# Patient Record
Sex: FEMALE | Race: WHITE | HISPANIC OR LATINO
[De-identification: names, ages, dates, MRNs, and addresses within clinical notes are randomized per-mention and may not be internally consistent; named-entity substitution may affect disease eponyms.]

---
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
## 2023-06-05 ENCOUNTER — NON-APPOINTMENT (OUTPATIENT)
Age: 18
End: 2023-06-05

## 2023-07-10 ENCOUNTER — APPOINTMENT (OUTPATIENT)
Dept: INTERNAL MEDICINE | Facility: CLINIC | Age: 18
End: 2023-07-10

## 2023-10-20 ENCOUNTER — APPOINTMENT (OUTPATIENT)
Dept: INTERNAL MEDICINE | Facility: CLINIC | Age: 18
End: 2023-10-20

## 2023-11-19 ENCOUNTER — EMERGENCY (EMERGENCY)
Facility: HOSPITAL | Age: 18
LOS: 1 days | Discharge: HOME | End: 2023-11-19
Attending: EMERGENCY MEDICINE
Payer: MEDICAID

## 2023-11-19 VITALS
TEMPERATURE: 98 F | HEART RATE: 61 BPM | SYSTOLIC BLOOD PRESSURE: 115 MMHG | OXYGEN SATURATION: 99 % | DIASTOLIC BLOOD PRESSURE: 78 MMHG | RESPIRATION RATE: 18 BRPM

## 2023-11-19 VITALS
WEIGHT: 134.92 LBS | OXYGEN SATURATION: 99 % | HEART RATE: 67 BPM | SYSTOLIC BLOOD PRESSURE: 126 MMHG | TEMPERATURE: 99 F | RESPIRATION RATE: 17 BRPM | DIASTOLIC BLOOD PRESSURE: 80 MMHG | HEIGHT: 65 IN

## 2023-11-19 PROBLEM — Z78.9 OTHER SPECIFIED HEALTH STATUS: Chronic | Status: ACTIVE | Noted: 2022-07-27

## 2023-11-19 PROCEDURE — 73140 X-RAY EXAM OF FINGER(S): CPT

## 2023-11-19 PROCEDURE — 99284 EMERGENCY DEPT VISIT MOD MDM: CPT

## 2023-11-19 PROCEDURE — 96374 THER/PROPH/DIAG INJ IV PUSH: CPT | Mod: XU

## 2023-11-19 PROCEDURE — 73140 X-RAY EXAM OF FINGER(S): CPT | Mod: 26,59,LT

## 2023-11-19 PROCEDURE — 99284 EMERGENCY DEPT VISIT MOD MDM: CPT | Mod: 25

## 2023-11-19 PROCEDURE — 90471 IMMUNIZATION ADMIN: CPT

## 2023-11-19 PROCEDURE — 73130 X-RAY EXAM OF HAND: CPT

## 2023-11-19 PROCEDURE — 73130 X-RAY EXAM OF HAND: CPT | Mod: 26,LT

## 2023-11-19 PROCEDURE — 12001 RPR S/N/AX/GEN/TRNK 2.5CM/<: CPT

## 2023-11-19 PROCEDURE — 90715 TDAP VACCINE 7 YRS/> IM: CPT

## 2023-11-19 RX ORDER — CEFAZOLIN SODIUM 1 G
2000 VIAL (EA) INJECTION ONCE
Refills: 0 | Status: COMPLETED | OUTPATIENT
Start: 2023-11-19 | End: 2023-11-19

## 2023-11-19 RX ORDER — CEPHALEXIN 500 MG
1 CAPSULE ORAL
Qty: 40 | Refills: 0
Start: 2023-11-19 | End: 2023-11-28

## 2023-11-19 RX ORDER — ACETAMINOPHEN 500 MG
975 TABLET ORAL ONCE
Refills: 0 | Status: COMPLETED | OUTPATIENT
Start: 2023-11-19 | End: 2023-11-19

## 2023-11-19 RX ORDER — TETANUS TOXOID, REDUCED DIPHTHERIA TOXOID AND ACELLULAR PERTUSSIS VACCINE, ADSORBED 5; 2.5; 8; 8; 2.5 [IU]/.5ML; [IU]/.5ML; UG/.5ML; UG/.5ML; UG/.5ML
0.5 SUSPENSION INTRAMUSCULAR ONCE
Refills: 0 | Status: COMPLETED | OUTPATIENT
Start: 2023-11-19 | End: 2023-11-19

## 2023-11-19 RX ADMIN — Medication 100 MILLIGRAM(S): at 12:36

## 2023-11-19 RX ADMIN — Medication 975 MILLIGRAM(S): at 11:59

## 2023-11-19 RX ADMIN — TETANUS TOXOID, REDUCED DIPHTHERIA TOXOID AND ACELLULAR PERTUSSIS VACCINE, ADSORBED 0.5 MILLILITER(S): 5; 2.5; 8; 8; 2.5 SUSPENSION INTRAMUSCULAR at 11:59

## 2023-11-19 NOTE — ED PROVIDER NOTE - CARE PROVIDER_API CALL
Ora Nam  Orthopaedic Surgery  [address] [place] [address]  Phone: [phone]  Fax: [phone]  Follow Up Time:

## 2023-11-19 NOTE — ED PROVIDER NOTE - CARE PLAN
Principal Discharge DX:	Laceration of finger   1 Principal Discharge DX:	Laceration of finger  Goal:	L middle finger

## 2023-11-19 NOTE — ED PROVIDER NOTE - NSFOLLOWUPINSTRUCTIONS_ED_ALL_ED_FT
Laceration    A laceration is a cut that goes through all of the layers of the skin and into the tissue that is right under the skin. Some lacerations heal on their own. Others need to be closed with skin adhesive strips, skin glue, stitches (sutures), or staples. Proper laceration care minimizes the risk of infection and helps the laceration to heal better.  If non-absorbable stitches or staples have been placed, they must be taken out within the time frame instructed by your healthcare provider.    SEEK IMMEDIATE MEDICAL CARE IF YOU HAVE ANY OF THE FOLLOWING SYMPTOMS: swelling around the wound, worsening pain, drainage from the wound, red streaking going away from your wound, inability to move finger or toe near the laceration, or discoloration of skin near the laceration. YOU WERE SEEN IN THE ED FOR: laceration, fracture    WHILE YOU WERE HERE, YOU HAD: xray, hand consult, sutures, antibiotics  YOU WERE PRESCRIBED: antibiotics  FOLLOW THE INSTRUCTIONS ON THE LABEL/CONTAINER    FOR PAIN, YOU MAY TAKE TYLENOL (Acetaminophen) AND/OR IBUPROFEN (Advil or Motrin). FOLLOW THE INSTRUCTIONS ON THE LABEL/CONTAINER.    please get sutures removed in 7-10 days.    Laceration    A laceration is a cut that goes through all of the layers of the skin and into the tissue that is right under the skin. Some lacerations heal on their own. Others need to be closed with skin adhesive strips, skin glue, stitches (sutures), or staples. Proper laceration care minimizes the risk of infection and helps the laceration to heal better.  If non-absorbable stitches or staples have been placed, they must be taken out within the time frame instructed by your healthcare provider.    SEEK IMMEDIATE MEDICAL CARE IF YOU HAVE ANY OF THE FOLLOWING SYMPTOMS: swelling around the wound, worsening pain, drainage from the wound, red streaking going away from your wound, inability to move finger or toe near the laceration, or discoloration of skin near the laceration. YOU WERE SEEN IN THE ED FOR: laceration, fracture    WHILE YOU WERE HERE, YOU HAD: xray, hand consult, sutures, antibiotics  YOU WERE PRESCRIBED: antibiotics  FOLLOW THE INSTRUCTIONS ON THE LABEL/CONTAINER    FOR PAIN, YOU MAY TAKE TYLENOL (Acetaminophen) AND/OR IBUPROFEN (Advil or Motrin). FOLLOW THE INSTRUCTIONS ON THE LABEL/CONTAINER.    please get sutures removed in 7-10 days. please follow up with the doctor below. call to make an appointment.     Laceration    A laceration is a cut that goes through all of the layers of the skin and into the tissue that is right under the skin. Some lacerations heal on their own. Others need to be closed with skin adhesive strips, skin glue, stitches (sutures), or staples. Proper laceration care minimizes the risk of infection and helps the laceration to heal better.  If non-absorbable stitches or staples have been placed, they must be taken out within the time frame instructed by your healthcare provider.    SEEK IMMEDIATE MEDICAL CARE IF YOU HAVE ANY OF THE FOLLOWING SYMPTOMS: swelling around the wound, worsening pain, drainage from the wound, red streaking going away from your wound, inability to move finger or toe near the laceration, or discoloration of skin near the laceration.

## 2023-11-19 NOTE — ED PROVIDER NOTE - ATTENDING CONTRIBUTION TO CARE
Attending MD Chambers: I personally have seen and examined this patient.  Resident note reviewed and agree on plan of care and except where noted.  See below for details.     Seen in [place], interviewed in Mongolian    18F with no reported contributory PMH/PSH/Meds, no known drug allergies, unknown last tetanus presents to the ED with pain at L middle finger after bed frame fell on finger.  Reports pain at distal aspect of L middle finger, reports limited ROM at DIP because of pain, denies change in sensory,  ?Nail may have lifted off, reports persistent bleeding.  Denies other injury.  Reports L hand dominant.      Exam:   General: NAD  HENT: head NCAT, airway patent   Chest: symmetric chest rise, no increased work of breathing  MSK: ranging neck freely, +2 radial, FROM at L middle finger MCP and PIP, limited ROM at DIP secondary to pain, +edema to distal tip of L middle finger with wound with extruded subq fat and two areas of bulla with heme collection noted, lateral aspect of nail bed with active bleeding  Neuro: moving all extremities spontaneously, sensory grossly intact, no gross neuro deficits  Psych: normal mood and affect     A/P: 18F with L middle finger crush injury, will obtain XR to eval for fracture, will update TDAP, will need repair

## 2023-11-19 NOTE — ED PROVIDER NOTE - PHYSICAL EXAMINATION
PHYSICAL EXAM:  CONSTITUTIONAL: Well appearing, awake, alert, oriented to person, place, time/situation and in no apparent distress.  HEAD: Atraumatic  EYES: Clear bilaterally, pupils equal, round and reactive to light.  ENMT: Airway patent, Nasal mucosa clear. Mouth with normal mucosa. Uvula is midline.   CARDIAC: Normal rate, regular rhythm. +S1/S2. No murmurs, rubs or gallops.  RESPIRATORY: Breathing unlabored. Breath sounds clear and equal bilaterally.  ABDOMEN:  Soft, nontender, nondistended. No rebound tenderness or guarding.  NEUROLOGICAL: Alert and oriented, no focal deficits, no motor or sensory deficits. Sensation intact x4 extremities.  SKIN: Skin warm and dry. No evidence of rashes or lesions.  MSK: L hand, peripheral pulses intact, strength intact and sensation to median, ulna, radial nerve intact. two 1mm lacs to posterior L#3 finger, with ecchymosis and edema to  the area. PHYSICAL EXAM:  CONSTITUTIONAL: Well appearing, awake, alert, oriented to person, place, time/situation and in no apparent distress.  HEAD: Atraumatic  EYES: Clear bilaterally, pupils equal, round and reactive to light.  ENMT: Airway patent, Nasal mucosa clear. Mouth with normal mucosa. Uvula is midline.   CARDIAC: Normal rate, regular rhythm. +S1/S2. No murmurs, rubs or gallops.  RESPIRATORY: Breathing unlabored. Breath sounds clear and equal bilaterally.  ABDOMEN:  Soft, nontender, nondistended. No rebound tenderness or guarding.  NEUROLOGICAL: Alert and oriented, no focal deficits, no motor or sensory deficits. Sensation intact x4 extremities.  SKIN: Skin warm and dry. No evidence of rashes or lesions.  MSK: L hand, peripheral pulses intact, strength intact and sensation to median, ulna, radial nerve intact. two 0.5cm lacs to posterior L#3 finger, with ecchymosis and edema, blood blister, to the area, blood to L lateral nail. tenderness and decreased ROM distal phalanx

## 2023-11-19 NOTE — ED ADULT NURSE NOTE - OBJECTIVE STATEMENT
19 y/o female presenting to ED for left 3rd digit finger lac. pt reports that she was cleaning her metal bed frame when she injured her finger at edge of bed frame. pt aox4 breathing even unlabored spontaneously, lac to left middle finger, bleeding controlled at this time. 17 y/o female presenting to ED for left 3rd digit finger lac. pt reports  metal bed frame fell onto her hand. pt aox4 breathing even unlabored spontaneously, lac to left middle finger, bleeding controlled at this time.

## 2023-11-19 NOTE — ED PROVIDER NOTE - CLINICAL SUMMARY MEDICAL DECISION MAKING FREE TEXT BOX
18-year-old female with no significant past medical history presents emergency department status post metal bedframe falling on left hand. Concern for frx vs lac. neurovascularly intact. will give tdap, meds, XR and suture area.

## 2023-11-19 NOTE — ED PROVIDER NOTE - PROGRESS NOTE DETAILS
Attending MD Chambers: Informed of fracture, will give Ancef Meeta Infante MD (PGY-2 EM): hand at bedside suturing area. patient will fu with hand. discussed return precautions, need for abx. Attending MD Chambers: Repaired by ortho, patient to follow up with Dr. Ora Nam and be dc'ed on Keflex.  Stable for discharge.  Patient informed in Dutch to keep splint on and comply with antibiotics.  Follow up instructions given, importance of follow up emphasized, return to ED parameters reviewed and patient verbalized understanding.  All questions answered, all concerns addressed.

## 2023-11-19 NOTE — ED PROVIDER NOTE - PATIENT PORTAL LINK FT
You can access the FollowMyHealth Patient Portal offered by [facility] by registering at the following website: [email or web address]. By joining [employer]’s FollowMyHealth portal, you will also be able to view your health information using other applications (apps) compatible with our system.

## 2023-11-19 NOTE — ED PROVIDER NOTE - OBJECTIVE STATEMENT
18-year-old female with no significant past medical history presents emergency department status post metal bedframe falling on left hand.  Patient is left-handed.  Patient denies hitting her head, loss of consciousness.  Patient is on a blood thinners or aspirin.  Patient did not take any medicine prior to arrival to the emergency department.  Patient presents with laceration to left #3 finger, pain to area.  Unknown last tetanus.

## 2023-12-22 ENCOUNTER — APPOINTMENT (OUTPATIENT)
Dept: ORTHOPEDIC SURGERY | Facility: CLINIC | Age: 18
End: 2023-12-22

## 2024-02-04 ENCOUNTER — EMERGENCY (EMERGENCY)
Facility: HOSPITAL | Age: 19
LOS: 1 days | Discharge: HOME | End: 2024-02-04
Attending: STUDENT IN AN ORGANIZED HEALTH CARE EDUCATION/TRAINING PROGRAM
Payer: MEDICAID

## 2024-02-04 VITALS
DIASTOLIC BLOOD PRESSURE: 87 MMHG | HEART RATE: 117 BPM | RESPIRATION RATE: 18 BRPM | OXYGEN SATURATION: 100 % | SYSTOLIC BLOOD PRESSURE: 127 MMHG | TEMPERATURE: 101 F | WEIGHT: 121.25 LBS

## 2024-02-04 PROCEDURE — 99284 EMERGENCY DEPT VISIT MOD MDM: CPT

## 2024-02-05 VITALS
OXYGEN SATURATION: 98 % | DIASTOLIC BLOOD PRESSURE: 68 MMHG | RESPIRATION RATE: 18 BRPM | HEART RATE: 86 BPM | SYSTOLIC BLOOD PRESSURE: 120 MMHG | TEMPERATURE: 98 F

## 2024-02-05 LAB
FLUAV AG NPH QL: SIGNIFICANT CHANGE UP
FLUBV AG NPH QL: SIGNIFICANT CHANGE UP
RSV RNA NPH QL NAA+NON-PROBE: SIGNIFICANT CHANGE UP
S PYO AG SPEC QL IA: POSITIVE
SARS-COV-2 RNA SPEC QL NAA+PROBE: SIGNIFICANT CHANGE UP

## 2024-02-05 PROCEDURE — 87637 SARSCOV2&INF A&B&RSV AMP PRB: CPT

## 2024-02-05 PROCEDURE — 93005 ELECTROCARDIOGRAM TRACING: CPT

## 2024-02-05 PROCEDURE — 99284 EMERGENCY DEPT VISIT MOD MDM: CPT | Mod: 25

## 2024-02-05 PROCEDURE — 87880 STREP A ASSAY W/OPTIC: CPT

## 2024-02-05 RX ORDER — AMOXICILLIN 250 MG/5ML
1 SUSPENSION, RECONSTITUTED, ORAL (ML) ORAL
Qty: 19 | Refills: 0
Start: 2024-02-05

## 2024-02-05 RX ORDER — IBUPROFEN 200 MG
600 TABLET ORAL ONCE
Refills: 0 | Status: COMPLETED | OUTPATIENT
Start: 2024-02-05 | End: 2024-02-05

## 2024-02-05 RX ORDER — ACETAMINOPHEN 500 MG
975 TABLET ORAL ONCE
Refills: 0 | Status: COMPLETED | OUTPATIENT
Start: 2024-02-05 | End: 2024-02-05

## 2024-02-05 RX ORDER — AMOXICILLIN 250 MG/5ML
500 SUSPENSION, RECONSTITUTED, ORAL (ML) ORAL ONCE
Refills: 0 | Status: COMPLETED | OUTPATIENT
Start: 2024-02-05 | End: 2024-02-05

## 2024-02-05 RX ADMIN — Medication 600 MILLIGRAM(S): at 03:07

## 2024-02-05 RX ADMIN — Medication 600 MILLIGRAM(S): at 01:15

## 2024-02-05 RX ADMIN — Medication 500 MILLIGRAM(S): at 03:07

## 2024-02-05 RX ADMIN — Medication 975 MILLIGRAM(S): at 03:07

## 2024-02-05 RX ADMIN — Medication 975 MILLIGRAM(S): at 01:15

## 2024-02-05 NOTE — ED PROVIDER NOTE - PATIENT PORTAL LINK FT
You can access the FollowMyHealth Patient Portal offered by [facility] by registering at the following website: [email or web address]. By joining [employer]’s FollowMyHealth portal, you will also be able to view your health information using other applications (apps) compatible with our system.

## 2024-02-05 NOTE — ED PROVIDER NOTE - CLINICAL SUMMARY MEDICAL DECISION MAKING FREE TEXT BOX
LAWRENCE Zavala PGY1- patient here with 2 days of throat pain with fever Tmax 100.9, multiple sick contacts, found to have tonsillar swelling and erythema on exam with shoddy tender cervical lymphadenopathy. Tachycardic here, initial temp 100.6; will give tylenol, obtain nasal swab, throat swab & culture to evaluate for viral illness vs strep throat. Likely dc home with supportive care +/- abx if strep positive.

## 2024-02-05 NOTE — ED PROVIDER NOTE - OBJECTIVE STATEMENT
The patient is a 17 y/o F with no chronic medical problems presenting with 2 day hx of fever Tmax 100.9, throat pain, and generalized body aches. Multiple family members sick at home with similar symptoms. Tylenol and Motrin help with symptoms, last taken this morning. Still tolerating PO though exacerbates throat pain. No nausea, vomiting, diarrhea, chest pain, dyspnea, dysuria, or any other symptoms.

## 2024-02-05 NOTE — ED PROVIDER NOTE - ATTENDING CONTRIBUTION TO CARE
Attending (Austin Izaguirre D.O.):  I have personally seen and examined this patient. I have performed a substantive portion of the visit including all aspects of the medical decision making. Resident, fellow, student, and/or ACP note reviewed. I agree on the plan of care except where noted.    young female with sick family members at home here with fever, no cough, + sore throat, still able to tolerate some PO. Hemodynam stable, airway intact, no drooling, no pooling, no uvular deviation. No PTA. + exudate on bilateral tonsils with pharyngeal erythema.. Clear lungs, no inc wob. Hx and exam likely strep pharyngitis. will flu swab. Oral hydration.

## 2024-02-05 NOTE — ED PROVIDER NOTE - NSFOLLOWUPINSTRUCTIONS_ED_ALL_ED_FT
- Follow up with your doctor in 1 week - bring copies of your results if you were given. If you do not have a primary doctor, please call [phone] to find one convenient for you    - Take your antibiotic (sent to your pharmacy) as prescribed    - Return to the ED for any new, worsening, or concerning symptoms to you    - Take ibuprofen/tylenol as needed for pain    - Rest and keep yourself hydrated with fluids

## 2024-02-05 NOTE — ED PROVIDER NOTE - PHYSICAL EXAMINATION
General: no acute distress  Psych: mood appropriate  Head: normocephalic; atraumatic  Eyes: conjunctivae clear bilaterally, sclerae anicteric  ENT: no nasal flaring, patent nares; bilateral tonsillar swelling with erythema, no exudates  Cardio: tachycardia with regular rhythm  Resp: clear to auscultation bilaterally  GI: abdomen soft, nontender, nondistended  : no CVA tenderness  Neuro: A&Ox3  Skin: no rashes or bruising noted  MSK: normal movement of extremities  Lymph/Vasc: no LE edema; shoddy tender cervical lymphadenopathy noted

## 2024-02-05 NOTE — ED ADULT NURSE NOTE - OBJECTIVE STATEMENT
Pt is a 18y female who presents to [facility] ED from triage c/o of flu-like symptoms. Pt endorses she developed symptoms 3 days ago, endorses having all over body aches/body pain, throat pain and low grade fevers, today pt developed a fever of 100.8F, endorses all over HA, and decreased PO intake due to throat pain, and chest discomfort, pt does endorse sick contacts, endorses her mom had similar symptoms. Endorses taking Tylenol in the AM. PMH Gastritis and denies any significant PSH. Pt is A&Ox4 currently denying any SOB, cough, abd pain, urinary symptoms, n/v. Pt ambulates independently at baseline. NKDA.

## 2024-02-05 NOTE — ED PROVIDER NOTE - IV ALTEPLASE EXCL REL HIDDEN
07/14/21        Chacha Carrillo  [address] [facility] [address]      Dear Chacha,    Please review the enclosed prep instructions for your procedure with Waqar Lopez MD     Date of Procedure: September 3, 2021  Time of Procedure:  A preop nurse  will contact you 2-3 days prior to your  procedure to confirm your arrival time.    Location:    [place] [address] [facility] (Please check in on the 2nd floor)  [place], [place]  [id number] [phone]      Important:   · You will need someone to drive you home and remain with you or check on you up to 24 hours after you go home.  · If you take blood thinners (i.e. Warfarin, Plavix, Xarelto, Eliquis, Aggrenox, Effient, Pradaxa) please call your Primary Care Provider/Cardiologist at least 10 days before your procedure for instructions on how many days before to hold your blood thinner.   · If you are diabetic and take insulin, please call your Primary Care Provider for instructions. Usually, it is recommended that you take half of your insulin dose the evening before and half your insulin dose the morning of your procedure.   · If you are taking the diet drug Phentermine, stop taking it 14 days before your procedure.   · The Pre-admission nurse will tell you which medications to take the morning of your procedure with a small sip of water. Do not take any other medications until after your procedure.    If you have any questions regarding these instructions or need to reschedule, please contact me at the telephone number and extension listed below.     Thank you,    Doris [phone]  Surgery Scheduler for Waqar Lopez MD  [facility] Pre-Admit Department            Colonoscopy with Split Dose and Dulcolax  Pre-Procedure Instructions     Please pick-up a Nulytely Prep Kit and a small box of 5 mg Dulcolax tablets within 1 week at the pharmacy your physician sent the prescription for the Nulytely Kit to. You do not  need a prescription for Dulcolax. Purchase a small box as you will only need 2 tablets. Bring these instructions with you when you go to the pharmacy. Do not mix the Nulytely solution until the day before the procedure.       Transportation:  · Make arrangements for someone (over 18 years of age) to drive you home after the procedure because you will be very drowsy. Please make these arrangements in advance. A taxi is not acceptable transportation. If you do not have transportation arranged, we will not be able to do the colonoscopy.  · Make arrangements for someone to stay with you or check in on you frequently the rest of the day/evening until the drowsiness has completely worn off.     Cancellation or Rescheduling:  · Due to everyone's busy schedules, it is important that you keep your appointment. If you must reschedule or cancel your appointment, please notify your physician's office at least 48 hours in advance.  After your Colonoscopy:  You will receive after-procedure information and instructions. In addition:  · Do not plan on going to work or doing anything for the rest of the day.  · You may resume eating and drinking with no limitations following the procedure, unless otherwise stated.  10 days Before Your Procedure:  · If you take blood thinners (i.e. Warfarin, Plavix, Xarelto, Eliquis, Aggrenox, Effient, Pradaxa) please call your Primary Care Provider/Cardiologist at least 10 days before your procedure for instructions on how many days before to hold your blood thinner.     1 Week/ 7 days Before Your Procedure:  · Do not eat popcorn, seeds, nuts or whole kernel corn.   · Do not take Carafate, iron supplements, Fish Oil, or Pepto-Bismol.   · Have clear liquids available to drink at home. See “Clear Liquid Diet Suggestion” sheet.  ·  the prep from pharmacy. Do not mix it until the day you need to start the colon prep.     5 Days Before Your Procedure:   · Do not eat products with Hinckley (a fat  substitute found in [employer] and Pringles Fat-Free chips) for 5 days before the procedure.                        Colon Preparation:  · To complete a successful colonoscopy, the bowel must be clean so that the physician can clearly view the colon. It is very important that you read all the instructions well in advance of the procedure. Without proper preparation, the colonoscopy will not be successful, and the test may have to be repeated.    1 Day Before Your Procedure:  · You may have only clear liquids to eat or drink all day long. You may not have any solid foods or dairy products, and you may not eat or drink anything that is red or purple. The more clear liquids you drink, the better off you will be. See attached “Clear Liquid Diet Suggestion” sheet.  · Do not drink any alcoholic beverages for at least 24 hours before the procedure.  · Do not take any other laxatives such as: Lomotil, Imodium, Effer-syllium, Metamucil, Citrucel, Konsyl, Hydrocil, or FiberCon.  · In the morning, mix the Nulytely prep with the flavor packet (if applicable) and one gallon of water, shake well to mix, and refrigerate to chill. If preferred, you may add some Gatorade, Powerade, or crystal light (no red, orange, or purple) with the water to add flavor, without adding more than one gallon of fluid to the prep. Do not further dilute the prep in any way.  · At 4:00 pm, start drinking half of the Nulytely prep. Drink one large (8-10 oz.) glass every 10-15 minutes until ½ of the gallon is gone. Using a straw to finish the drink may be helpful.  · In most cases, loose, liquidy bowel movements will begin within 30 minutes to one hour. You should remain within easy access of a bathroom. Continue to drink the prep regardless of stool frequency. It is normal for this to cause chills.   · You will drink half of the Nulytely prep now.  The remaining 1/2 gallon will be used tomorrow.  Put the remaining prep solution in the  refrigerator.   · Continue to drink clear liquids throughout the evening (see attached “Clear Liquid Diet Suggestion” sheet).  · At 7:00 pm, take 2 Dulcolax laxative tablets. It is normal for this to cause some abdominal cramping.    · Should you experience rectal irritation due to frequent stooling, you may apply Desitin, Balmex, Vitamin A&D ointment or other similar product to the irritated area.     Day of Your Procedure:  · Starting 4 hours before your procedure drink the remainder of Nulytely prep over 1 hour, drinking one large (8-10 oz.) glass every 10-15 minutes until it is gone.  · Do not eat anything after you finish the prep.  · You must not have anything to drink 2 hours before your arrival.   · You will be informed by the Pre-admission Testing nurse which medications to take morning of your procedure with a small sip of water only. Do not take any other medications until after your procedure.   · In the morning, your stools should have a clear to see-through cloudy yellow appearance with no formed substance. If your stools are darker or have formed substance, please call the location where your procedure is scheduled to be done and ask for the pre-op nurse, who will get further instructions from your physician                  Clear Liquid Diet Suggestion Sheet    Preparation:  You have received instructions that explain what you need to do to prepare for your colonoscopy. Your colon must be empty for the colonoscopy to be thorough and safe. To prepare for the procedure, you will have to follow a liquid diet for 1 to 3 days beforehand. The longer you do the clear liquid diet, the better off you will be. The liquid diet should be clear and not contain food colorings (Red, Orange, or Purple) and may include:  • Bouillon or broth  • Strained/ Pulp free fruit juices (lemonade, apple, or white grape)  • Water  • Plain Coffee (no dairy products, but you can add sugar)  • Plain Tea (no milk or creamers or  dairy products)   • White Soda  • Gelatin/ Jell-O (no red, orange, purple)  • Popsicles  • Gatorade, Powerade, etc.  • Clear Hard Candy    No alcoholic beverages for at least 24 hours before your procedure.   An example of a typical menu on the Clear Liquid Diet may look like this:  Breakfast:  Snack:    · 1 glass of pulp-free fruit juice and/or water • 1 popsicle   · 1 bowl gelatin/ Jell-O • 1 cup of coffee or tea, no dairy products and/or water/ white soda   · 1 cup of coffee or tea, no dairy products  • Sugar or honey is acceptable   · Sugar or honey is acceptable    Snack: Dinner:    · 1 glass of pulp-free fruit juice and/or water · 1 glass of pulp-free fruit juice and/or water   · 1 bowl gelatin/ Jell-O · 1 cup broth    · 1 bowl gelatin/ Jell-O   Lunch:     · 1 glass of pulp-free fruit juice and/or water    · 1 cup broth    · 1 bowl gelatin/ Jell-O      Thank you for choosing [facility]   show

## 2024-02-16 ENCOUNTER — APPOINTMENT (OUTPATIENT)
Dept: INTERNAL MEDICINE | Facility: CLINIC | Age: 19
End: 2024-02-16

## 2024-05-02 ENCOUNTER — APPOINTMENT (OUTPATIENT)
Dept: INTERNAL MEDICINE | Facility: CLINIC | Age: 19
End: 2024-05-02
Payer: MEDICAID

## 2024-05-02 ENCOUNTER — OUTPATIENT (OUTPATIENT)
Dept: OUTPATIENT SERVICES | Facility: HOSPITAL | Age: 19
LOS: 1 days | End: 2024-05-02
Payer: MEDICAID

## 2024-05-02 VITALS
BODY MASS INDEX: 26.11 KG/M2 | WEIGHT: 133 LBS | DIASTOLIC BLOOD PRESSURE: 60 MMHG | HEART RATE: 76 BPM | SYSTOLIC BLOOD PRESSURE: 100 MMHG | HEIGHT: 60 IN | OXYGEN SATURATION: 98 %

## 2024-05-02 DIAGNOSIS — Z80.0 FAMILY HISTORY OF MALIGNANT NEOPLASM OF DIGESTIVE ORGANS: ICD-10-CM

## 2024-05-02 DIAGNOSIS — Z86.19 PERSONAL HISTORY OF OTHER INFECTIOUS AND PARASITIC DISEASES: ICD-10-CM

## 2024-05-02 DIAGNOSIS — Z87.19 PERSONAL HISTORY OF OTHER DISEASES OF THE DIGESTIVE SYSTEM: ICD-10-CM

## 2024-05-02 DIAGNOSIS — Z87.42 PERSONAL HISTORY OF OTHER DISEASES OF THE FEMALE GENITAL TRACT: ICD-10-CM

## 2024-05-02 DIAGNOSIS — I10 ESSENTIAL (PRIMARY) HYPERTENSION: ICD-10-CM

## 2024-05-02 DIAGNOSIS — Z78.9 OTHER SPECIFIED HEALTH STATUS: ICD-10-CM

## 2024-05-02 DIAGNOSIS — R10.84 GENERALIZED ABDOMINAL PAIN: ICD-10-CM

## 2024-05-02 DIAGNOSIS — Z00.00 ENCOUNTER FOR GENERAL ADULT MEDICAL EXAMINATION WITHOUT ABNORMAL FINDINGS: ICD-10-CM

## 2024-05-02 DIAGNOSIS — Z00.00 ENCOUNTER FOR GENERAL ADULT MEDICAL EXAMINATION W/OUT ABNORMAL FINDINGS: ICD-10-CM

## 2024-05-02 PROCEDURE — 99203 OFFICE O/P NEW LOW 30 MIN: CPT

## 2024-05-02 PROCEDURE — 86735 MUMPS ANTIBODY: CPT

## 2024-05-02 PROCEDURE — 86706 HEP B SURFACE ANTIBODY: CPT

## 2024-05-02 PROCEDURE — T1013: CPT

## 2024-05-02 PROCEDURE — G0463: CPT

## 2024-05-02 PROCEDURE — 87389 HIV-1 AG W/HIV-1&-2 AB AG IA: CPT

## 2024-05-02 PROCEDURE — 80053 COMPREHEN METABOLIC PANEL: CPT

## 2024-05-02 PROCEDURE — 85025 COMPLETE CBC W/AUTO DIFF WBC: CPT

## 2024-05-02 PROCEDURE — 87340 HEPATITIS B SURFACE AG IA: CPT

## 2024-05-02 PROCEDURE — 81001 URINALYSIS AUTO W/SCOPE: CPT

## 2024-05-02 PROCEDURE — 86705 HEP B CORE ANTIBODY IGM: CPT

## 2024-05-02 PROCEDURE — 86704 HEP B CORE ANTIBODY TOTAL: CPT

## 2024-05-02 PROCEDURE — 84702 CHORIONIC GONADOTROPIN TEST: CPT

## 2024-05-02 PROCEDURE — 84443 ASSAY THYROID STIM HORMONE: CPT

## 2024-05-02 PROCEDURE — 86765 RUBEOLA ANTIBODY: CPT

## 2024-05-02 PROCEDURE — 80061 LIPID PANEL: CPT

## 2024-05-02 PROCEDURE — 83036 HEMOGLOBIN GLYCOSYLATED A1C: CPT

## 2024-05-02 PROCEDURE — 86803 HEPATITIS C AB TEST: CPT

## 2024-05-02 PROCEDURE — 86762 RUBELLA ANTIBODY: CPT

## 2024-05-06 ENCOUNTER — TRANSCRIPTION ENCOUNTER (OUTPATIENT)
Age: 19
End: 2024-05-06

## 2024-05-10 LAB
ALBUMIN SERPL ELPH-MCNC: 4.5 G/DL
ALP BLD-CCNC: 68 U/L
ALT SERPL-CCNC: 16 U/L
ANION GAP SERPL CALC-SCNC: 13 MMOL/L
AST SERPL-CCNC: 18 U/L
BILIRUB SERPL-MCNC: 0.8 MG/DL
BUN SERPL-MCNC: 9 MG/DL
CALCIUM SERPL-MCNC: 9.2 MG/DL
CHLORIDE SERPL-SCNC: 103 MMOL/L
CHOLEST SERPL-MCNC: 146 MG/DL
CO2 SERPL-SCNC: 23 MMOL/L
CREAT SERPL-MCNC: 0.55 MG/DL
EGFR: 134 ML/MIN/1.73M2
GLUCOSE SERPL-MCNC: 89 MG/DL
HBV CORE IGG+IGM SER QL: NONREACTIVE
HBV CORE IGM SER QL: NONREACTIVE
HBV SURFACE AB SER QL: NONREACTIVE
HBV SURFACE AB SERPL IA-ACNC: 5.6 MIU/ML
HBV SURFACE AG SER QL: NONREACTIVE
HCG SERPL-MCNC: <1 MIU/ML
HCV AB SER QL: NONREACTIVE
HCV S/CO RATIO: 0.52 S/CO
HDLC SERPL-MCNC: 50 MG/DL
LDLC SERPL CALC-MCNC: 78 MG/DL
MEV IGG FLD QL IA: 15.4 AU/ML
MEV IGG+IGM SER-IMP: NORMAL
MUV AB SER-ACNC: NEGATIVE
MUV IGG SER QL IA: 6.4 AU/ML
NONHDLC SERPL-MCNC: 96 MG/DL
POTASSIUM SERPL-SCNC: 4 MMOL/L
PROT SERPL-MCNC: 7.3 G/DL
RUBV IGG FLD-ACNC: 1.5 INDEX
RUBV IGG SER-IMP: POSITIVE
SODIUM SERPL-SCNC: 138 MMOL/L
TRIGL SERPL-MCNC: 93 MG/DL
TSH SERPL-ACNC: 0.73 UIU/ML

## 2024-05-16 LAB
APPEARANCE: CLEAR
BACTERIA: ABNORMAL /HPF
BASOPHILS # BLD AUTO: 0.07 K/UL
BASOPHILS NFR BLD AUTO: 1 %
BILIRUBIN URINE: NEGATIVE
BLOOD URINE: NEGATIVE
CAST: 0 /LPF
COLOR: YELLOW
EOSINOPHIL # BLD AUTO: 0.09 K/UL
EOSINOPHIL NFR BLD AUTO: 1.3 %
EPITHELIAL CELLS: 4 /HPF
ESTIMATED AVERAGE GLUCOSE: 108 MG/DL
GLUCOSE QUALITATIVE U: NEGATIVE MG/DL
HBA1C MFR BLD HPLC: 5.4 %
HCT VFR BLD CALC: 38.7 %
HGB BLD-MCNC: 12.5 G/DL
HIV1+2 AB SPEC QL IA.RAPID: NONREACTIVE
IMM GRANULOCYTES NFR BLD AUTO: 0.1 %
KETONES URINE: NEGATIVE MG/DL
LEUKOCYTE ESTERASE URINE: NEGATIVE
LYMPHOCYTES # BLD AUTO: 2 K/UL
LYMPHOCYTES NFR BLD AUTO: 28.7 %
MAN DIFF?: NORMAL
MCHC RBC-ENTMCNC: 31.2 PG
MCHC RBC-ENTMCNC: 32.3 GM/DL
MCV RBC AUTO: 96.5 FL
MICROSCOPIC-UA: NORMAL
MONOCYTES # BLD AUTO: 0.56 K/UL
MONOCYTES NFR BLD AUTO: 8 %
NEUTROPHILS # BLD AUTO: 4.23 K/UL
NEUTROPHILS NFR BLD AUTO: 60.9 %
NITRITE URINE: NEGATIVE
PH URINE: 6.5
PLATELET # BLD AUTO: 303 K/UL
PROTEIN URINE: NEGATIVE MG/DL
RBC # BLD: 4.01 M/UL
RBC # FLD: 12.9 %
RED BLOOD CELLS URINE: 3 /HPF
SPECIFIC GRAVITY URINE: 1.03
UROBILINOGEN URINE: 1 MG/DL
WBC # FLD AUTO: 6.96 K/UL
WHITE BLOOD CELLS URINE: 0 /HPF

## 2024-05-17 NOTE — PLAN
[FreeTextEntry1] : 18F with h/o of gastritis who presents for CPE and establish care.   #abdominal pain/assoc. symptoms: - p/w chronic gastritis, abdominal discomfort s/p meal with post prandial fullness and loose stools x1 week for 2-3 months - h/o of gastric cancer in grandfather, mother is being screened  - abdominal bloating improved (intermittently) with BM - reports 1 episode of bright red blood when 17yo when she had viral gastritis episode with 15 pounds of weight loss (no appetite for 2 weeks)   Plan - GI referral for H.pylori and possible EGD screening given family hx of gastric cancer  - Eval for IBS vs. h.pylori vs. gastritis  - Recommend to record food/symptom diary  - follow-up hepatitis titers given h/o of Hep B  #h/o recurrent vaginal infections  - pt is sexually active with 1 male - reports prior workup for STD neg - has 3 recurrent vaginal infections treated w/abx (last episode 3-4 months ago)   PHQ form - pt denies any depression/SI/HI - paper form marked "every day" for lost of interest in hobbies/activities, but states she meant that she is always tired from school - refilled on electronic paperwork  CPE - routine labs ordered, follow-up - pap smear, recommended OBGYN eval for annual exam  - GI screening, referred to GI referral  - immunizations, f/u titers  Case discussed with attending  RTC for repeat annual exam, or sooner for acute follow-up

## 2024-05-17 NOTE — HISTORY OF PRESENT ILLNESS
[de-identified] : 18F from [place] with family hx of gastric cancer who presents for CPE   Patients states she is doing well.  - She has a history of gastritis when she moved to [place] at the age of 13, lives here with her mother and brother. She is a college student studying computer science. Her grandfather (maternal) has h/o of gastric cancer. She reports having one episode of severe viral gastritis at 16yo with n/v/d and weight loss of 15 pounds due to poor po intake, no recurrent episode. No known hx of endoscopy or prior h.pylor. No NSAID use. She does report one episode of bringht red blood in stool when she was 16 during that gastric virus. Otherwise, she has bowel movements daily, and 1 episode of soft, non-formed, nonblood stools for the past 3-4 weeks. She reports bloating and post prandial fullness related to food and intermittently improved with bowel movements. Otherwise, the patient can tolerate all diets, just is intolerate with diets late night. No h/o of GERD or prior h.pylori treatment. No dramatic weight loss, fever, chills, night sweats, or hematemesis.   She has a h/o of recurrent vaginal infections, most recent episode 3 months ago (3rd episode).  Pt is sexually active with 1 male partner, last encounter 2 months ago. LMP 4/17-4/20 regular. Uses condoms, no intrauterine device or OCP. She has not been evaluated by OBGYN for annual exam and is interested.  She denies any dysuria at this time, no vaginal discharge or foul odor. No known h/o of STD per pt report  She does have h/o of oral herpes and and episode of pruritis when she was 17 yo during her viral gastritis episode.   Otherwise, pt has no new complaints.   PMHx: Hep B, gastritis, HSV1 PSHx: none FHx: gastric cancer (maternal grandfather)  Social: University student studying computer science, plays volleyball, sexually active with 1 male partner. Drinks socially on the weekends, no smoking or illicity alcohol use.  Immunizations: Unsure Allergies: None Rx: Other than the antibiotics for her recurrent vaginal infections, none

## 2024-05-17 NOTE — HEALTH RISK ASSESSMENT
[Good] : ~his/her~  mood as  good [2 - 4 times a month (2 pts)] : 2-4 times a month (2 points) [3 or 4 (1 pt)] : 3 or 4  (1 point) [Never (0 pts)] : Never (0 points) [No] : In the past 12 months have you used drugs other than those required for medical reasons? No [No falls in past year] : Patient reported no falls in the past year [1] : 1) Little interest or pleasure doing things for several days (1) [0] : 2) Feeling down, depressed, or hopeless: Not at all (0) [PHQ-2 Negative - No further assessment needed] : PHQ-2 Negative - No further assessment needed [HIV Test offered] : HIV Test offered [Hepatitis C test offered] : Hepatitis C test offered [None] : None [Alone] : lives alone [Student] : student [Single] : single [Sexually Active] : sexually active [Feels Safe at Home] : Feels safe at home [Fully functional (bathing, dressing, toileting, transferring, walking, feeding)] : Fully functional (bathing, dressing, toileting, transferring, walking, feeding) [Never] : Never [0-4] : 0-4 [Audit-CScore] : 3 [de-identified] : volleyball  [de-identified] : intermittent fasting, skips breakfast, no junk food  [[id number]] : 1 [Change in mental status noted] : No change in mental status noted [Language] : denies difficulty with language [High Risk Behavior] : no high risk behavior [Reports changes in hearing] : Reports no changes in hearing [Reports changes in vision] : Reports no changes in vision [Reports changes in dental health] : Reports no changes in dental health

## 2024-05-17 NOTE — REVIEW OF SYSTEMS
[Abdominal Pain] : abdominal pain [Negative] : Heme/Lymph [Nausea] : no nausea [Constipation] : no constipation [Diarrhea] : diarrhea [Vomiting] : no vomiting [Heartburn] : no heartburn [Dysuria] : no dysuria [Hematuria] : no hematuria

## 2024-05-17 NOTE — INTERPRETER SERVICES
[[employer] ] : provided by [employer]   [Time Spent: ____ minutes] : Total time spent using  services: [unfilled] minutes. The patient's primary language is not English thus required  services. [Interpreters_IDNumber] : [id number] [Interpreters_FullName] : Ally [TWNoteComboBox1] : Citizen of the [place]

## 2024-05-18 NOTE — ED ADULT TRIAGE NOTE - HEART RATE (BEATS/MIN)
The patient is Stable - Low risk of patient condition declining or worsening    Shift Goals  Clinical Goals: comfort measures  Patient Goals: AFIA  Family Goals: not at bedside    Progress made toward(s) clinical / shift goals:      Problem: Pain - Standard  Goal: Alleviation of pain or a reduction in pain to the patient’s comfort goal  Outcome: Progressing     Problem: Skin Integrity  Goal: Skin integrity is maintained or improved  Outcome: Progressing     Problem: Fall Risk  Goal: Patient will remain free from falls  Outcome: Progressing       Patient is not progressing towards the following goals:       67 none

## 2024-06-03 ENCOUNTER — LABORATORY RESULT (OUTPATIENT)
Age: 19
End: 2024-06-03

## 2024-06-03 ENCOUNTER — APPOINTMENT (OUTPATIENT)
Dept: OBGYN | Facility: CLINIC | Age: 19
End: 2024-06-03
Payer: MEDICAID

## 2024-06-03 ENCOUNTER — OUTPATIENT (OUTPATIENT)
Dept: OUTPATIENT SERVICES | Facility: HOSPITAL | Age: 19
LOS: 1 days | End: 2024-06-03
Payer: MEDICAID

## 2024-06-03 VITALS — DIASTOLIC BLOOD PRESSURE: 72 MMHG | WEIGHT: 129.2 LBS | SYSTOLIC BLOOD PRESSURE: 108 MMHG

## 2024-06-03 DIAGNOSIS — Z01.419 ENCOUNTER FOR GYNECOLOGICAL EXAMINATION (GENERAL) (ROUTINE) W/OUT ABNORMAL FINDINGS: ICD-10-CM

## 2024-06-03 DIAGNOSIS — N76.0 ACUTE VAGINITIS: ICD-10-CM

## 2024-06-03 PROCEDURE — 87661 TRICHOMONAS VAGINALIS AMPLIF: CPT

## 2024-06-03 PROCEDURE — 87491 CHLMYD TRACH DNA AMP PROBE: CPT

## 2024-06-03 PROCEDURE — 90651 9VHPV VACCINE 2/3 DOSE IM: CPT

## 2024-06-03 PROCEDURE — 81513 NFCT DS BV RNA VAG FLU ALG: CPT

## 2024-06-03 PROCEDURE — G0463: CPT

## 2024-06-03 PROCEDURE — 81025 URINE PREGNANCY TEST: CPT

## 2024-06-03 PROCEDURE — G0444 DEPRESSION SCREEN ANNUAL: CPT | Mod: 59

## 2024-06-03 PROCEDURE — 99385 PREV VISIT NEW AGE 18-39: CPT | Mod: 25

## 2024-06-03 PROCEDURE — 87591 N.GONORRHOEAE DNA AMP PROB: CPT

## 2024-06-03 PROCEDURE — 90471 IMMUNIZATION ADMIN: CPT

## 2024-06-03 PROCEDURE — 87481 CANDIDA DNA AMP PROBE: CPT

## 2024-06-03 NOTE — PHYSICAL EXAM
[Chaperone Declined] : Patient declined chaperone [Awake] : awake [Alert] : alert [Acute Distress] : no acute distress [Mass] : no breast mass [Nipple Discharge] : no nipple discharge [Axillary LAD] : no axillary lymphadenopathy [Soft] : soft [Tender] : non tender [Distended] : not distended [H/Smegaly] : no hepatosplenomegaly [Oriented x3] : oriented to person, place, and time [Depressed Mood] : not depressed [Flat Affect] : affect not flat [Normal] : uterus [Labia Majora] : labia major [Labia Minora] : labia minora [No Bleeding] : there was no active vaginal bleeding [Pap Obtained] : a Pap smear was not performed [Motion Tenderness] : there was no cervical motion tenderness [Normal Position] : in a normal position [Tenderness] : nontender [Uterine Adnexae] : were not tender and not enlarged [FreeTextEntry6] : no abnormalities noted on exam

## 2024-06-03 NOTE — HISTORY OF PRESENT ILLNESS
[Good] : being in good health [Healthy Diet] : a healthy diet [Regular Exercise] : regular exercise [Weight Concerns] : no concerns with her weight [Reproductive Age] : is of reproductive age [Menstrual Problems] : reports normal menses [Pregnancy History] : denies prior pregnancies [Up to Date] : not up to date with ~his/her~ STD screening [Definite:  ___ (Date)] : the last menstrual period was [unfilled] [Sexually Active] : is not sexually active [Contraception] : does not use contraception

## 2024-06-04 DIAGNOSIS — Z23 ENCOUNTER FOR IMMUNIZATION: ICD-10-CM

## 2024-06-04 DIAGNOSIS — Z01.419 ENCOUNTER FOR GYNECOLOGICAL EXAMINATION (GENERAL) (ROUTINE) WITHOUT ABNORMAL FINDINGS: ICD-10-CM

## 2024-06-04 LAB
BV BACTERIA RRNA VAG QL NAA+PROBE: SIGNIFICANT CHANGE UP
C GLABRATA RNA VAG QL NAA+PROBE: SIGNIFICANT CHANGE UP
C TRACH RRNA SPEC QL NAA+PROBE: SIGNIFICANT CHANGE UP
CANDIDA RRNA VAG QL PROBE: SIGNIFICANT CHANGE UP
N GONORRHOEA RRNA SPEC QL NAA+PROBE: SIGNIFICANT CHANGE UP
T VAGINALIS RRNA SPEC QL NAA+PROBE: SIGNIFICANT CHANGE UP

## 2024-08-05 ENCOUNTER — APPOINTMENT (OUTPATIENT)
Dept: OBGYN | Facility: CLINIC | Age: 19
End: 2024-08-05

## 2024-10-23 ENCOUNTER — APPOINTMENT (OUTPATIENT)
Age: 19
End: 2024-10-23

## 2024-11-13 ENCOUNTER — APPOINTMENT (OUTPATIENT)
Age: 19
End: 2024-11-13
Payer: COMMERCIAL

## 2024-11-13 PROCEDURE — D9310: CPT

## 2025-01-03 ENCOUNTER — APPOINTMENT (OUTPATIENT)
Age: 20
End: 2025-01-03
Payer: COMMERCIAL

## 2025-01-03 PROCEDURE — D9222: CPT

## 2025-01-03 PROCEDURE — D9223: CPT

## 2025-01-03 PROCEDURE — D7140: CPT

## 2025-03-18 ENCOUNTER — NON-APPOINTMENT (OUTPATIENT)
Age: 20
End: 2025-03-18

## 2025-03-18 ENCOUNTER — APPOINTMENT (OUTPATIENT)
Dept: OPHTHALMOLOGY | Facility: CLINIC | Age: 20
End: 2025-03-18
Payer: MEDICAID

## 2025-03-18 PROCEDURE — 92015 DETERMINE REFRACTIVE STATE: CPT | Mod: NC

## 2025-03-18 PROCEDURE — 92004 COMPRE OPH EXAM NEW PT 1/>: CPT | Mod: 25

## 2025-04-05 ENCOUNTER — NON-APPOINTMENT (OUTPATIENT)
Age: 20
End: 2025-04-05

## 2025-04-23 NOTE — ED ADULT TRIAGE NOTE - ESI TRIAGE ACUITY LEVEL, MLM
Received patient on cart, A&Ox2, on NRB, IVF TKO. Surgical incision sites WDL. Report received from OR RN and Dr. Pruett.   
Report given to Faye MENDOZA RN. Patient A&Ox3, RA, IVF TKO. Abdominal binder in place, surgical incisions WDL.   
4

## 2025-04-30 ENCOUNTER — APPOINTMENT (OUTPATIENT)
Dept: OBGYN | Facility: CLINIC | Age: 20
End: 2025-04-30

## 2025-05-08 ENCOUNTER — APPOINTMENT (OUTPATIENT)
Dept: INTERNAL MEDICINE | Facility: CLINIC | Age: 20
End: 2025-05-08

## 2025-07-26 ENCOUNTER — NON-APPOINTMENT (OUTPATIENT)
Age: 20
End: 2025-07-26

## 2025-07-31 ENCOUNTER — APPOINTMENT (OUTPATIENT)
Dept: INTERNAL MEDICINE | Facility: CLINIC | Age: 20
End: 2025-07-31